# Patient Record
Sex: FEMALE | Race: WHITE | NOT HISPANIC OR LATINO | URBAN - METROPOLITAN AREA
[De-identification: names, ages, dates, MRNs, and addresses within clinical notes are randomized per-mention and may not be internally consistent; named-entity substitution may affect disease eponyms.]

---

## 2020-01-29 ENCOUNTER — EMERGENCY (EMERGENCY)
Facility: HOSPITAL | Age: 45
LOS: 1 days | Discharge: HOME | End: 2020-01-29
Attending: EMERGENCY MEDICINE
Payer: COMMERCIAL

## 2020-01-29 VITALS
TEMPERATURE: 98 F | HEART RATE: 73 BPM | WEIGHT: 136.03 LBS | HEIGHT: 64 IN | RESPIRATION RATE: 16 BRPM | OXYGEN SATURATION: 98 % | DIASTOLIC BLOOD PRESSURE: 80 MMHG | SYSTOLIC BLOOD PRESSURE: 115 MMHG

## 2020-01-29 PROCEDURE — 99282 EMERGENCY DEPT VISIT SF MDM: CPT

## 2020-01-29 PROCEDURE — 82962 GLUCOSE BLOOD TEST: CPT

## 2020-01-29 NOTE — ED PROVIDER NOTE - OBJECTIVE STATEMENT
45 year old female with PMHx of diabetes mellitus and no pertinent PSHx presents to the ED with complaints of a possible foreign body in her abdomen. Patient reports that she wears a glucose monitoring devise which requires changing every ten days. Patient states that earlier today she was using the monitor to administer glucose, but when the needle retracted from her abdomen, she did not see the censor on the device. Patient reports that when she tore off the device she did not see the censor, causing her to believe that the thin plastic censor may still be lodged under the skin in her abdomen. Patient notes that it is possible that she may have never inserted the wire censor in the devise in the first place. In the ED, patient is currently denying any pain, and states that she can not feel anything inside her abdomen. Patient denies all other acute complaints.   Allergies: penicillin (hives) 45 year old female with PMHx of diabetes mellitus and no pertinent PSHx presents to the ED with complaints of a possible foreign body in her abdomen. Patient reports that she wears a glucose monitoring devise which requires changing every ten days. Patient states that earlier today she was using the monitor to administer glucose, but when the needle retracted from her abdomen, she did not see the censor on the device. Patient reports that when she tore off the device she did not see the censor, causing her to believe that the thin plastic sensor may still be lodged under the skin in her abdomen. Patient notes that it is possible that she may have never inserted the wire censor in the devise in the first place. In the ED, patient is currently denying any pain, and states that she can not feel anything inside her abdomen. Patient denies all other acute complaints.   Allergies: penicillin (hives)

## 2020-01-29 NOTE — ED PROVIDER NOTE - PATIENT PORTAL LINK FT
You can access the FollowMyHealth Patient Portal offered by [facility] by registering at the following website: [email or web address]. By joining [employer]’s FollowMyHealth portal, you will also be able to view your health information using other applications (apps) compatible with our system.

## 2020-01-29 NOTE — ED ADULT TRIAGE NOTE - CHIEF COMPLAINT QUOTE
on cont. glucose monitor  and needle got stuck to abdominal area on cont. glucose monitor  and needle got stuck to abdominal area and doesn't know if needle is still there on cont. glucose monitor  and needle got stuck to abdominal area on the device and doesn't know if needle is still there

## 2020-01-29 NOTE — ED PROVIDER NOTE - CLINICAL SUMMARY MEDICAL DECISION MAKING FREE TEXT BOX
Patient presents concerned that the censor of her glucose monitoring device may have stayed retained under her abdominal skin because she was unable to pull it out. Exam unremarkable. On evaluation of introducing device, found catheter lodged in needle of devise on machine. Patient stable for discharge. Patient presents concerned that the sensor of her glucose monitoring device may have stayed retained under her abdominal skin because she was unable to pull it out. Exam unremarkable. On evaluation of introducing device, found sensor lodged in needle of device on machine. Patient stable for discharge.

## 2021-10-26 ENCOUNTER — TRANSCRIPTION ENCOUNTER (OUTPATIENT)
Age: 46
End: 2021-10-26

## 2022-02-10 ENCOUNTER — NON-APPOINTMENT (OUTPATIENT)
Age: 47
End: 2022-02-10

## 2022-02-11 ENCOUNTER — APPOINTMENT (OUTPATIENT)
Dept: CARDIOLOGY | Facility: CLINIC | Age: 47
End: 2022-02-11
Payer: COMMERCIAL

## 2022-02-11 VITALS
WEIGHT: 135 LBS | HEART RATE: 84 BPM | RESPIRATION RATE: 14 BRPM | HEIGHT: 64 IN | OXYGEN SATURATION: 99 % | DIASTOLIC BLOOD PRESSURE: 81 MMHG | SYSTOLIC BLOOD PRESSURE: 137 MMHG | BODY MASS INDEX: 23.05 KG/M2

## 2022-02-11 DIAGNOSIS — R07.89 OTHER CHEST PAIN: ICD-10-CM

## 2022-02-11 DIAGNOSIS — F41.9 ANXIETY DISORDER, UNSPECIFIED: ICD-10-CM

## 2022-02-11 DIAGNOSIS — E10.9 TYPE 1 DIABETES MELLITUS W/OUT COMPLICATIONS: ICD-10-CM

## 2022-02-11 PROCEDURE — 99204 OFFICE O/P NEW MOD 45 MIN: CPT

## 2022-02-11 NOTE — DISCUSSION/SUMMARY
[FreeTextEntry1] : 1.  I reassured her that I doubted that the symptoms she is presently having were cardiac in nature\par \par 2.  Nonetheless I recommended a noninvasive work-up to include an exercise stress test and an echocardiogram\par \par Once these are completed, I will discuss results in detail with the patient and send the results to her internist Dr. [email or web address]

## 2022-02-11 NOTE — REASON FOR VISIT
[FreeTextEntry1] : Brandie Moore 47 years old type I diabetic here for evaluation of atypical chest pain

## 2022-02-11 NOTE — PHYSICAL EXAM
[Well Nourished] : well nourished [No Acute Distress] : no acute distress [Normal Conjunctiva] : normal conjunctiva [Normal Venous Pressure] : normal venous pressure [Normal S1, S2] : normal S1, S2 [No Murmur] : no murmur [Clear Lung Fields] : clear lung fields [Soft] : abdomen soft [Non Tender] : non-tender [No Edema] : no edema [Moves all extremities] : moves all extremities [Alert and Oriented] : alert and oriented

## 2022-02-11 NOTE — HISTORY OF PRESENT ILLNESS
[FreeTextEntry1] : The patient has diabetes type 1 on insulin for over 20 years.  She has no history of hypertension, non-smoker but does have a history of some mild hyperlipidemia previously on a statin.\par \par She works long hours but is fairly sedentary and does not exercise.\par By her own admission, she has an anxiety disorder.\par \par She has been experiencing sharp left-sided chest discomfort not exertionally related usually related to periods of time when she is quite anxious.  Her internist placed her on Zoloft and clonazepam though she has not started them\par \par Of note, the patient is trying to be pregnant.  She is 47 years old\par \par With normal activity going up and down flight of stairs walking even at a vigorous pace she has no shortness of breath or chest pain.  Most of this chest pain occurs at rest is sharp in nature brief in onset without associated shortness of breath\par \par EKG from her internist Dr. Gr sinus rhythm within normal limits\par

## 2022-02-11 NOTE — ASSESSMENT
[FreeTextEntry1] :  Brandie Moore 47 years old with type 1 diabetes with atypical chest pain.  The pain sounds very much nonischemic.  There is a strong element of anxiety\par \par Nonetheless being a type I diabetic she is at risk for vascular disease.  She is also interested in becoming pregnant\par \par Her EKG is normal and her physical exam is normal and blood pressure is well controlled

## 2022-04-04 ENCOUNTER — APPOINTMENT (OUTPATIENT)
Dept: CARDIOLOGY | Facility: CLINIC | Age: 47
End: 2022-04-04
Payer: COMMERCIAL

## 2022-04-04 PROCEDURE — 93015 CV STRESS TEST SUPVJ I&R: CPT

## 2022-04-04 PROCEDURE — 93306 TTE W/DOPPLER COMPLETE: CPT
